# Patient Record
Sex: MALE | Race: WHITE
[De-identification: names, ages, dates, MRNs, and addresses within clinical notes are randomized per-mention and may not be internally consistent; named-entity substitution may affect disease eponyms.]

---

## 2018-02-28 ENCOUNTER — HOSPITAL ENCOUNTER (INPATIENT)
Dept: HOSPITAL 17 - PHED | Age: 48
LOS: 3 days | Discharge: HOME | DRG: 638 | End: 2018-03-03
Attending: HOSPITALIST | Admitting: HOSPITALIST
Payer: SELF-PAY

## 2018-02-28 VITALS
OXYGEN SATURATION: 98 % | HEART RATE: 101 BPM | SYSTOLIC BLOOD PRESSURE: 139 MMHG | DIASTOLIC BLOOD PRESSURE: 75 MMHG | RESPIRATION RATE: 18 BRPM

## 2018-02-28 VITALS
HEART RATE: 126 BPM | DIASTOLIC BLOOD PRESSURE: 100 MMHG | TEMPERATURE: 97.8 F | OXYGEN SATURATION: 95 % | SYSTOLIC BLOOD PRESSURE: 173 MMHG | RESPIRATION RATE: 18 BRPM

## 2018-02-28 VITALS
SYSTOLIC BLOOD PRESSURE: 123 MMHG | DIASTOLIC BLOOD PRESSURE: 54 MMHG | RESPIRATION RATE: 18 BRPM | OXYGEN SATURATION: 97 % | HEART RATE: 102 BPM

## 2018-02-28 VITALS — BODY MASS INDEX: 41.75 KG/M2 | HEIGHT: 73 IN | WEIGHT: 315 LBS

## 2018-02-28 VITALS
HEART RATE: 100 BPM | SYSTOLIC BLOOD PRESSURE: 128 MMHG | DIASTOLIC BLOOD PRESSURE: 72 MMHG | OXYGEN SATURATION: 100 % | RESPIRATION RATE: 18 BRPM

## 2018-02-28 DIAGNOSIS — K31.84: ICD-10-CM

## 2018-02-28 DIAGNOSIS — E86.0: ICD-10-CM

## 2018-02-28 DIAGNOSIS — E66.01: ICD-10-CM

## 2018-02-28 DIAGNOSIS — F17.210: ICD-10-CM

## 2018-02-28 DIAGNOSIS — I10: ICD-10-CM

## 2018-02-28 DIAGNOSIS — K76.0: ICD-10-CM

## 2018-02-28 DIAGNOSIS — E87.6: ICD-10-CM

## 2018-02-28 DIAGNOSIS — E11.43: ICD-10-CM

## 2018-02-28 DIAGNOSIS — Z91.14: ICD-10-CM

## 2018-02-28 DIAGNOSIS — R00.0: ICD-10-CM

## 2018-02-28 DIAGNOSIS — F12.90: ICD-10-CM

## 2018-02-28 DIAGNOSIS — Z91.19: ICD-10-CM

## 2018-02-28 DIAGNOSIS — K08.89: ICD-10-CM

## 2018-02-28 DIAGNOSIS — E11.10: Primary | ICD-10-CM

## 2018-02-28 LAB
ALBUMIN SERPL-MCNC: 3.9 GM/DL (ref 3.4–5)
ALP SERPL-CCNC: 72 U/L (ref 45–117)
ALT SERPL-CCNC: 76 U/L (ref 12–78)
AST SERPL-CCNC: 26 U/L (ref 15–37)
BASOPHILS # BLD AUTO: 0.3 TH/MM3 (ref 0–0.2)
BASOPHILS NFR BLD: 2.4 % (ref 0–2)
BILIRUB SERPL-MCNC: 0.5 MG/DL (ref 0.2–1)
BUN SERPL-MCNC: 13 MG/DL (ref 7–18)
BUN SERPL-MCNC: 16 MG/DL (ref 7–18)
CALCIUM SERPL-MCNC: 8.4 MG/DL (ref 8.5–10.1)
CALCIUM SERPL-MCNC: 9.2 MG/DL (ref 8.5–10.1)
CHLORIDE SERPL-SCNC: 104 MEQ/L (ref 98–107)
CHLORIDE SERPL-SCNC: 98 MEQ/L (ref 98–107)
COLOR UR: YELLOW
CREAT SERPL-MCNC: 0.76 MG/DL (ref 0.6–1.3)
CREAT SERPL-MCNC: 0.82 MG/DL (ref 0.6–1.3)
EOSINOPHIL # BLD: 0 TH/MM3 (ref 0–0.4)
EOSINOPHIL NFR BLD: 0.4 % (ref 0–4)
ERYTHROCYTE [DISTWIDTH] IN BLOOD BY AUTOMATED COUNT: 12.9 % (ref 11.6–17.2)
GFR SERPLBLD BASED ON 1.73 SQ M-ARVRAT: 100 ML/MIN (ref 89–?)
GFR SERPLBLD BASED ON 1.73 SQ M-ARVRAT: 109 ML/MIN (ref 89–?)
GLUCOSE SERPL-MCNC: 324 MG/DL (ref 74–106)
GLUCOSE SERPL-MCNC: 375 MG/DL (ref 74–106)
GLUCOSE UR STRIP-MCNC: (no result) MG/DL
HCO3 BLD-SCNC: 16.5 MEQ/L (ref 21–32)
HCO3 BLD-SCNC: 20.7 MEQ/L (ref 21–32)
HCT VFR BLD CALC: 51.4 % (ref 39–51)
HGB BLD-MCNC: 18.1 GM/DL (ref 13–17)
HGB UR QL STRIP: (no result)
INR PPP: 1 RATIO
KETONES UR STRIP-MCNC: (no result) MG/DL
LYMPHOCYTES # BLD AUTO: 0.9 TH/MM3 (ref 1–4.8)
LYMPHOCYTES NFR BLD AUTO: 7.2 % (ref 9–44)
MCH RBC QN AUTO: 32.6 PG (ref 27–34)
MCHC RBC AUTO-ENTMCNC: 35.2 % (ref 32–36)
MCV RBC AUTO: 92.4 FL (ref 80–100)
MONOCYTE #: 0.6 TH/MM3 (ref 0–0.9)
MONOCYTES NFR BLD: 5.3 % (ref 0–8)
NEUTROPHILS # BLD AUTO: 10.3 TH/MM3 (ref 1.8–7.7)
NEUTROPHILS NFR BLD AUTO: 84.7 % (ref 16–70)
NITRITE UR QL STRIP: (no result)
PLATELET # BLD: 225 TH/MM3 (ref 150–450)
PMV BLD AUTO: 8.7 FL (ref 7–11)
PROT SERPL-MCNC: 8.1 GM/DL (ref 6.4–8.2)
PROTHROMBIN TIME: 10.6 SEC (ref 9.8–11.6)
RBC # BLD AUTO: 5.56 MIL/MM3 (ref 4.5–5.9)
RBC #/AREA URNS HPF: (no result) /HPF (ref 0–3)
SODIUM SERPL-SCNC: 137 MEQ/L (ref 136–145)
SODIUM SERPL-SCNC: 138 MEQ/L (ref 136–145)
SP GR UR STRIP: (no result) (ref 1–1.03)
SQUAMOUS #/AREA URNS HPF: (no result) /HPF (ref 0–5)
TROPONIN I SERPL-MCNC: (no result) NG/ML (ref 0.02–0.05)
URINE LEUKOCYTE ESTERASE: (no result)
WBC # BLD AUTO: 12.1 TH/MM3 (ref 4–11)

## 2018-02-28 PROCEDURE — 82805 BLOOD GASES W/O2 SATURATION: CPT

## 2018-02-28 PROCEDURE — 84100 ASSAY OF PHOSPHORUS: CPT

## 2018-02-28 PROCEDURE — 80053 COMPREHEN METABOLIC PANEL: CPT

## 2018-02-28 PROCEDURE — 96374 THER/PROPH/DIAG INJ IV PUSH: CPT

## 2018-02-28 PROCEDURE — 87804 INFLUENZA ASSAY W/OPTIC: CPT

## 2018-02-28 PROCEDURE — 83690 ASSAY OF LIPASE: CPT

## 2018-02-28 PROCEDURE — 80048 BASIC METABOLIC PNL TOTAL CA: CPT

## 2018-02-28 PROCEDURE — 85610 PROTHROMBIN TIME: CPT

## 2018-02-28 PROCEDURE — 71045 X-RAY EXAM CHEST 1 VIEW: CPT

## 2018-02-28 PROCEDURE — 85025 COMPLETE CBC W/AUTO DIFF WBC: CPT

## 2018-02-28 PROCEDURE — 83735 ASSAY OF MAGNESIUM: CPT

## 2018-02-28 PROCEDURE — 96375 TX/PRO/DX INJ NEW DRUG ADDON: CPT

## 2018-02-28 PROCEDURE — 83036 HEMOGLOBIN GLYCOSYLATED A1C: CPT

## 2018-02-28 PROCEDURE — 81001 URINALYSIS AUTO W/SCOPE: CPT

## 2018-02-28 PROCEDURE — 87641 MR-STAPH DNA AMP PROBE: CPT

## 2018-02-28 PROCEDURE — 87040 BLOOD CULTURE FOR BACTERIA: CPT

## 2018-02-28 PROCEDURE — 82010 KETONE BODYS QUAN: CPT

## 2018-02-28 PROCEDURE — 93005 ELECTROCARDIOGRAM TRACING: CPT

## 2018-02-28 PROCEDURE — 85730 THROMBOPLASTIN TIME PARTIAL: CPT

## 2018-02-28 PROCEDURE — 82948 REAGENT STRIP/BLOOD GLUCOSE: CPT

## 2018-02-28 PROCEDURE — 74177 CT ABD & PELVIS W/CONTRAST: CPT

## 2018-02-28 PROCEDURE — 84484 ASSAY OF TROPONIN QUANT: CPT

## 2018-02-28 PROCEDURE — 96376 TX/PRO/DX INJ SAME DRUG ADON: CPT

## 2018-02-28 PROCEDURE — 96361 HYDRATE IV INFUSION ADD-ON: CPT

## 2018-02-28 RX ADMIN — PHENYTOIN SODIUM SCH MLS/HR: 50 INJECTION INTRAMUSCULAR; INTRAVENOUS at 22:38

## 2018-02-28 NOTE — PD
HPI


Chief Complaint:  GI Complaint


Time Seen by Provider:  19:05


Travel History


International Travel<30 days:  No


Contact w/Intl Traveler<30days:  No


Traveled to known affect area:  No





History of Present Illness


HPI


46yo M with no significant PMH presents to the ED with c/o abdominal pain for 1 

day.  It is periumbilical, associated with nonbloody vomiting and nonbloody 

diarrhea.  Pt feels warm but no documented fever.  Denies any chest pain but 

feels burning in his chest.  Denies any sob, dysuria, hematuria, focal weakness 

or numbness.





PFSH


Past Medical History


Diabetes:  Yes


Patient Takes Glucophage:  No (DOES NOT TAKE MEDS)


Hypertension:  Yes


Medical other:  Yes (DENTAL ABSCESS)


Tetanus Vaccination:  Never Vaccinated


Influenza Vaccination:  No





Social History


Alcohol Use:  Yes (ONCE A MONTH)


Tobacco Use:  Yes (1PPD)


Substance Use:  No





Allergies-Medications


(Allergen,Severity, Reaction):  


Coded Allergies:  


     No Known Allergies (Unverified , 2/28/18)


Reported Meds & Prescriptions





Reported Meds & Active Scripts


Active


Reported


Tylenol (Acetaminophen) 325 Mg Tab Unknown Dose PO ONCE


Ibuprofen 200 Mg Cap Unknown Dose  Q4H PRN


[Nsaid]   Unknown Dose  


Norco (Hydrocodone-Acetaminophen) 5 Mg-325 Mg Tab Unknown Dose PO Q4H PRN








Review of Systems


Except as stated in HPI:  all other systems reviewed are Neg





Physical Exam


Narrative


GENERAL: 46yo M in mild distress. 


SKIN: Focused skin assessment warm/dry.


HEAD: Atraumatic. Normocephalic. 


EYES: Pupils equal and round. No scleral icterus. No injection or drainage. 


CARDIOVASCULAR: Regular rate and rhythm.  No murmur appreciated.


RESPIRATORY: No accessory muscle use. Clear to auscultation. Breath sounds 

equal bilaterally. 


GASTROINTESTINAL: Abdomen soft, +TTP above umbilicus.  No rebound tenderness or 

guarding.


MUSCULOSKELETAL: No obvious deformities. No clubbing.  No cyanosis.  No edema. 


NEUROLOGICAL: Awake and alert. No obvious cranial nerve deficits.  Motor 

grossly within normal limits. Normal speech.


PSYCHIATRIC: Appropriate mood and affect; insight and judgment normal.





Data


Data


Last Documented VS





Vital Signs








  Date Time  Temp Pulse Resp B/P (MAP) Pulse Ox O2 Delivery O2 Flow Rate FiO2


 


2/28/18 22:02  101 18 139/75 (96) 98 Room Air  


 


2/28/18 18:17 97.8       








Orders





 Orders


Complete Blood Count With Diff (2/28/18 19:08)


Comprehensive Metabolic Panel (2/28/18 19:08)


Lipase (2/28/18 19:08)


Prothrombin Time / Inr (Pt) (2/28/18 19:08)


Act Partial Throm Time (Ptt) (2/28/18 19:08)


Urinalysis - C+S If Indicated (2/28/18 19:08)


Ct Abd/Pel W Iv Contrast(Rout) (2/28/18 19:08)


Morphine Inj (Morphine Inj) (2/28/18 19:15)


Ondansetron Inj (Zofran Inj) (2/28/18 19:15)


Sodium Chlor 0.9% 1000 Ml Inj (Ns 1000 M (2/28/18 19:08)


Electrocardiogram (2/28/18 19:08)


Chest, Single Ap (2/28/18 19:08)


Troponin I (2/28/18 19:08)


Sodium Chlor 0.9% 1000 Ml Inj (Ns 1000 M (2/28/18 20:15)


Ondansetron Inj (Zofran Inj) (2/28/18 20:30)


Blood Gas Venous (Vbg) (2/28/18 20:16)


Beta Hydroxybutyrate (Acetone) (2/28/18 19:30)


Iohexol 350 Inj (Omnipaque 350 Inj) (2/28/18 20:31)


Insulin Regular (Iv Infusion) (Novolin R (2/28/18 21:45)


Potassium Chlor 20 Meq Premix (Kcl 20 Me (2/28/18 21:45)


Admit To Inpatient (2/28/18 )


Cardiac Monitor / Telemetry NIKOS.Q8H (2/28/18 21:55)


^ Teach Patient (2/28/18 21:55)


Diet Npo (3/1/18 Breakfast)


Bedside Glucose NIKOS.Q1H (2/28/18 21:55)


Sodium Chlor 0.9% 1000 Ml Inj (Ns 1000 M (2/28/18 21:55)


Dext 5%-Nacl 0.9% 1000 Ml Inj (D5w-Ns 10 (2/28/18 21:55)


Insulin Regular (Iv Infusion) (Novolin R (2/28/18 22:00)


Potassium Chlor 20 Meq Premix (Kcl 20 Me (2/28/18 22:00)


Sodium Phosphate Inj (Sodium Phosphate I (2/28/18 22:00)


Basic Metabolic Panel (Bmp) (3/1/18 02:55)


Basic Metabolic Panel (Bmp) (3/1/18 08:55)


Basic Metabolic Panel (Bmp) (3/1/18 14:55)


Basic Metabolic Panel (Bmp) (3/1/18 20:55)


Magnesium (Mg) (3/1/18 02:55)


Magnesium (Mg) (3/1/18 08:55)


Magnesium (Mg) (3/1/18 14:55)


Magnesium (Mg) (3/1/18 20:55)


Phosphorus (Po4) (3/1/18 02:55)


Phosphorus (Po4) (3/1/18 08:55)


Phosphorus (Po4) (3/1/18 14:55)


Phosphorus (Po4) (3/1/18 20:55)


Beta Hydroxybutyrate (Acetone) (3/1/18 08:55)


Beta Hydroxybutyrate (Acetone) (3/1/18 20:55)


^ Initiate Protocol (2/28/18 21:55)


Instruction (2/28/18 21:55)


Misc Nursing Information (2/28/18 22:00)


Chlorhexidine 2% Cloth (Chlorhexidine 2% (3/1/18 04:00)


Chlorhexidine 2% Cloth (Chlorhexidine 2% (2/28/18 22:00)


Mrsa Pcr Surveillance (2/28/18 21:55)


Inpatient Certification (2/28/18 )


Admit Order (Ed Use Only) (2/28/18 22:09)





Labs





Laboratory Tests








Test


  2/28/18


19:30 2/28/18


20:10 2/28/18


20:15


 


White Blood Count 12.1 TH/MM3   


 


Red Blood Count 5.56 MIL/MM3   


 


Hemoglobin 18.1 GM/DL   


 


Hematocrit 51.4 %   


 


Mean Corpuscular Volume 92.4 FL   


 


Mean Corpuscular Hemoglobin 32.6 PG   


 


Mean Corpuscular Hemoglobin


Concent 35.2 % 


  


  


 


 


Red Cell Distribution Width 12.9 %   


 


Platelet Count 225 TH/MM3   


 


Mean Platelet Volume 8.7 FL   


 


Neutrophils (%) (Auto) 84.7 %   


 


Lymphocytes (%) (Auto) 7.2 %   


 


Monocytes (%) (Auto) 5.3 %   


 


Eosinophils (%) (Auto) 0.4 %   


 


Basophils (%) (Auto) 2.4 %   


 


Neutrophils # (Auto) 10.3 TH/MM3   


 


Lymphocytes # (Auto) 0.9 TH/MM3   


 


Monocytes # (Auto) 0.6 TH/MM3   


 


Eosinophils # (Auto) 0.0 TH/MM3   


 


Basophils # (Auto) 0.3 TH/MM3   


 


CBC Comment DIFF FINAL   


 


Differential Comment    


 


Prothrombin Time 10.6 SEC   


 


Prothromb Time International


Ratio 1.0 RATIO 


  


  


 


 


Activated Partial


Thromboplast Time 22.2 SEC 


  


  


 


 


Blood Urea Nitrogen 16 MG/DL   


 


Creatinine 0.82 MG/DL   


 


Random Glucose 375 MG/DL   


 


Total Protein 8.1 GM/DL   


 


Albumin 3.9 GM/DL   


 


Calcium Level 9.2 MG/DL   


 


Alkaline Phosphatase 72 U/L   


 


Aspartate Amino Transf


(AST/SGOT) 26 U/L 


  


  


 


 


Alanine Aminotransferase


(ALT/SGPT) 76 U/L 


  


  


 


 


Total Bilirubin 0.5 MG/DL   


 


Sodium Level 137 MEQ/L   


 


Potassium Level 4.0 MEQ/L   


 


Chloride Level 98 MEQ/L   


 


Carbon Dioxide Level 20.7 MEQ/L   


 


Anion Gap 18 MEQ/L   


 


Estimat Glomerular Filtration


Rate 100 ML/MIN 


  


  


 


 


Troponin I


  LESS THAN 0.02


NG/ML 


  


 


 


Lipase 114 U/L   


 


B-Hydroxybutyrate 6.65 MMOL/L   


 


Urine Color  YELLOW  


 


Urine Turbidity  CLEAR  


 


Urine pH  5.5  


 


Urine Specific Gravity


  


  GREATER THAN


1.035 


 


 


Urine Protein  NEG mg/dL  


 


Urine Glucose (UA)


  


  1000 OR


GREATER mg/dL 


 


 


Urine Ketones


  


  80 OR GREATER


mg/dL 


 


 


Urine Occult Blood  TRACE  


 


Urine Nitrite  NEG  


 


Urine Bilirubin  NEG  


 


Urine Leukocyte Esterase  NEG  


 


Urine RBC  0-3 /hpf  


 


Urine Squamous Epithelial


Cells 


  0-5 /hpf 


  


 


 


Microscopic Urinalysis Comment


  


  CULT NOT


INDICATED 


 


 


Blood Gas Puncture Site   RARM 


 


Blood Gas Patient Temperature   98.6 


 


Venous Blood pH   7.31 


 


Venous Blood Partial Pressure


CO2 


  


  42 mmHg 


 


 


Venous Blood Partial Pressure


O2 


  


  32 mmHg 


 


 


Venous Blood HCO3   21 mmol/L 


 


Venous Blood Oxygen Saturation   55 % 


 


Venous Blood Oxygen Content   13.8 Vol % 


 


Venous Blood Base Excess   -4.4 mmol/L 


 


Blood Gas Inspired Oxygen   21 % 











[facility]


Medical Decision Making


Medical Screen Exam Complete:  Yes


Emergency Medical Condition:  Yes


Interpretation(s)


EKG; Sinus tachycardia at 108bpm.  Normal axis.  No ST segment elevation or 

depression.


Differential Diagnosis


Acute gastroenteritis vs. colitis vs. GERD vs. viral syndrome


Narrative Course


46yo morbidly obese male here with abdominal pain, vomiting and diarrhea.  Pt 

had appendectomy.  Pt states he has burning midsternal chest pain which is very 

atypical.  Labs reviewed, WBC 12.1.  H/H elevated, likely secondary to 

dehydration.  CO2 mildly decreased at 20.7 and increased anion gap of 18.  

Glucose is elevated at 375.  Pt is mildly acidotic at 7.31. b-Hydroxybutrate is 

elevated at 6.65.   Troponin negative.  Lipase normal.  Pt said he has history 

of HTN, DM but has not seen any physician for a long time so does not take any 

medication.  Lipase normal.  Pt given NS IVF, zofran and morphine.  Pt 

reevaluated at bedside and pain has improved but vomited again.  Pt given a 

second dose of zofran.  Ordered a second liter of NS IVF.  Pt has polydipsia 

and polyuria and mild DKA so placed on insulin drip with potassium replacement 

as needed.  K: 4.0.  CT a/p showed enlarged fatty liver.  Unremarkable CT.  CXR 

showed mild increased interstitial markings bilaterally which are nonspecific.  

Discussed with Dr. Carrion's PA and accepted to their service.


Critical Care Narrative


Aggregate critical care time was  40 minutes. Time to perform other separately 

billable procedures was not  


included in the critical care time. My time did not include minutes spent 

treating any other patients simultaneously or on  


activities that did not directly contribute to the patient's treatment.  





The services I provided to this patient were to treat and/or prevent clinically 

significant deterioration that could result  


in:  cardiovascular collapse or death.





I provided critical care services requiring my management, as noted below:


Chart data review, documentation time, medication orders and management, vital 

sign assessments/reviewing monitor data,  


ordering and reviewing lab tests, ordering and interpreting/reviewing x-rays 

and diagnostic studies, care of the patient  


and discussion of the patient with the admitting physicians.





Diagnosis





 Primary Impression:  


 DKA (diabetic ketoacidoses)


 Qualified Codes:  E13.10 - Other specified diabetes mellitus with ketoacidosis 

without coma





Admitting Information


Admitting Physician Requests:  it











Apple Ennis DO Feb 28, 2018 19:11

## 2018-02-28 NOTE — RADRPT
EXAM DATE/TIME:  02/28/2018 20:26 

 

[facility] COMPARISON:     

No previous studies available for comparison.

 

 

INDICATIONS :     

Abdominal pain with nausea, vomiting, and diarrhea.

                      

 

IV CONTRAST:     

100 cc Omnipaque 350 (iohexol) IV 

 

 

ORAL CONTRAST:      

No oral contrast ingested.

                      

 

RADIATION DOSE:     

31.35 CTDIvol (mGy) ; Patient body habitus

 

 

MEDICAL HISTORY :     

Hypertension. Diabetes mellitus type 2. 

 

SURGICAL HISTORY :      

None. 

 

ENCOUNTER:      

Initial

 

ACUITY:      

1 day

 

PAIN SCALE:      

8/10

 

LOCATION:         

abdomen

 

TECHNIQUE:     

Volumetric scanning of the abdomen and pelvis was performed.  Using automated exposure control and ad
justment of the mA and/or kV according to patient size, radiation dose was kept as low as reasonably 
achievable to obtain optimal diagnostic quality images.  DICOM format image data is available electro
nically for review and comparison.  

 

FINDINGS:     

 

LOWER LUNGS:     

The visualized lower lungs are clear.

 

LIVER:     

The liver is enlarged and demonstrates diffuse fatty infiltration. No focal mass is noted. There is n
o dilation of the biliary tree.  No calcified gallstones.

 

SPLEEN:     

Normal size without lesion.

 

PANCREAS:     

Within normal limits.

 

KIDNEYS:     

Normal in size and shape.  There is no mass, stone or hydronephrosis.

 

ADRENAL GLANDS:     

Within normal limits.

 

VASCULAR:     

There is no aortic aneurysm.

 

BOWEL/MESENTERY:     

The stomach, small bowel, and colon demonstrate no acute abnormality.  There is no free intraperitone
al air or fluid.

 

ABDOMINAL WALL:     

Within normal limits.

 

RETROPERITONEUM:     

There is no lymphadenopathy. 

 

BLADDER:     

No wall thickening or mass. 

 

REPRODUCTIVE:     

Within normal limits.

 

INGUINAL:     

There is no lymphadenopathy or hernia. 

 

MUSCULOSKELETAL:     

Within normal limits for patient age. 

 

CONCLUSION:     Enlarged fatty liver. Otherwise unremarkable CT of the abdomen and pelvis. 

 

 

 Guru Barros MD on February 28, 2018 at 20:47           

Board Certified Radiologist.

 This report was verified electronically.

## 2018-02-28 NOTE — RADRPT
EXAM DATE/TIME:  02/28/2018 19:30 

 

[facility] COMPARISON:     

No previous studies available for comparison.

 

                     

INDICATIONS :     

Chest discomfort, vomiting, abdominal pain starting today

                     

 

MEDICAL HISTORY :     

None.          

 

SURGICAL HISTORY :     

None.   

 

ENCOUNTER:     

Initial                                        

 

ACUITY:     

1 day      

 

PAIN SCORE:     

0/10

 

LOCATION:     

Bilateral chest 

 

FINDINGS:     

Mild increased interstitial markings are noted bilaterally and are nonspecific. The heart is normal i
n size. No focal alveolar consolidation is noted.

 

CONCLUSION:     Mild increased interstitial markings bilaterally which are nonspecific. 

 

 

 Guru Barros MD on February 28, 2018 at 20:11           

Board Certified Radiologist.

 This report was verified electronically.

## 2018-03-01 VITALS — HEART RATE: 84 BPM

## 2018-03-01 VITALS
OXYGEN SATURATION: 99 % | SYSTOLIC BLOOD PRESSURE: 137 MMHG | RESPIRATION RATE: 16 BRPM | HEART RATE: 114 BPM | DIASTOLIC BLOOD PRESSURE: 81 MMHG

## 2018-03-01 VITALS
DIASTOLIC BLOOD PRESSURE: 78 MMHG | RESPIRATION RATE: 33 BRPM | HEART RATE: 110 BPM | SYSTOLIC BLOOD PRESSURE: 150 MMHG | TEMPERATURE: 98.9 F

## 2018-03-01 VITALS
SYSTOLIC BLOOD PRESSURE: 133 MMHG | TEMPERATURE: 97.8 F | HEART RATE: 102 BPM | DIASTOLIC BLOOD PRESSURE: 66 MMHG | RESPIRATION RATE: 22 BRPM

## 2018-03-01 VITALS — SYSTOLIC BLOOD PRESSURE: 145 MMHG | RESPIRATION RATE: 20 BRPM | HEART RATE: 92 BPM | DIASTOLIC BLOOD PRESSURE: 65 MMHG

## 2018-03-01 VITALS — SYSTOLIC BLOOD PRESSURE: 148 MMHG | DIASTOLIC BLOOD PRESSURE: 78 MMHG | HEART RATE: 110 BPM | RESPIRATION RATE: 21 BRPM

## 2018-03-01 VITALS — HEART RATE: 82 BPM | SYSTOLIC BLOOD PRESSURE: 135 MMHG | RESPIRATION RATE: 23 BRPM | DIASTOLIC BLOOD PRESSURE: 78 MMHG

## 2018-03-01 VITALS — HEART RATE: 118 BPM | RESPIRATION RATE: 20 BRPM

## 2018-03-01 VITALS — HEART RATE: 96 BPM

## 2018-03-01 VITALS
TEMPERATURE: 97.6 F | RESPIRATION RATE: 26 BRPM | SYSTOLIC BLOOD PRESSURE: 150 MMHG | HEART RATE: 116 BPM | OXYGEN SATURATION: 98 % | DIASTOLIC BLOOD PRESSURE: 72 MMHG

## 2018-03-01 VITALS — DIASTOLIC BLOOD PRESSURE: 72 MMHG | SYSTOLIC BLOOD PRESSURE: 128 MMHG | HEART RATE: 110 BPM | RESPIRATION RATE: 21 BRPM

## 2018-03-01 VITALS — OXYGEN SATURATION: 96 % | RESPIRATION RATE: 20 BRPM | HEART RATE: 128 BPM

## 2018-03-01 VITALS — RESPIRATION RATE: 28 BRPM | DIASTOLIC BLOOD PRESSURE: 88 MMHG | HEART RATE: 80 BPM | SYSTOLIC BLOOD PRESSURE: 150 MMHG

## 2018-03-01 VITALS
SYSTOLIC BLOOD PRESSURE: 157 MMHG | OXYGEN SATURATION: 99 % | HEART RATE: 114 BPM | DIASTOLIC BLOOD PRESSURE: 81 MMHG | RESPIRATION RATE: 16 BRPM

## 2018-03-01 VITALS — TEMPERATURE: 98.5 F | HEART RATE: 104 BPM

## 2018-03-01 VITALS — HEART RATE: 82 BPM

## 2018-03-01 VITALS — HEART RATE: 128 BPM | DIASTOLIC BLOOD PRESSURE: 71 MMHG | RESPIRATION RATE: 41 BRPM | SYSTOLIC BLOOD PRESSURE: 155 MMHG

## 2018-03-01 VITALS
SYSTOLIC BLOOD PRESSURE: 129 MMHG | RESPIRATION RATE: 23 BRPM | TEMPERATURE: 98.4 F | OXYGEN SATURATION: 92 % | HEART RATE: 118 BPM | DIASTOLIC BLOOD PRESSURE: 80 MMHG

## 2018-03-01 VITALS
RESPIRATION RATE: 20 BRPM | HEART RATE: 80 BPM | OXYGEN SATURATION: 98 % | SYSTOLIC BLOOD PRESSURE: 131 MMHG | DIASTOLIC BLOOD PRESSURE: 76 MMHG

## 2018-03-01 VITALS — DIASTOLIC BLOOD PRESSURE: 72 MMHG | SYSTOLIC BLOOD PRESSURE: 168 MMHG

## 2018-03-01 VITALS — SYSTOLIC BLOOD PRESSURE: 162 MMHG | DIASTOLIC BLOOD PRESSURE: 81 MMHG | RESPIRATION RATE: 35 BRPM | HEART RATE: 112 BPM

## 2018-03-01 VITALS
SYSTOLIC BLOOD PRESSURE: 154 MMHG | RESPIRATION RATE: 34 BRPM | OXYGEN SATURATION: 98 % | DIASTOLIC BLOOD PRESSURE: 88 MMHG | HEART RATE: 118 BPM

## 2018-03-01 VITALS — RESPIRATION RATE: 19 BRPM | HEART RATE: 102 BPM | DIASTOLIC BLOOD PRESSURE: 76 MMHG | SYSTOLIC BLOOD PRESSURE: 155 MMHG

## 2018-03-01 VITALS — HEART RATE: 80 BPM

## 2018-03-01 VITALS — DIASTOLIC BLOOD PRESSURE: 91 MMHG | HEART RATE: 90 BPM | SYSTOLIC BLOOD PRESSURE: 148 MMHG | RESPIRATION RATE: 22 BRPM

## 2018-03-01 VITALS — HEART RATE: 108 BPM

## 2018-03-01 VITALS
OXYGEN SATURATION: 96 % | HEART RATE: 96 BPM | RESPIRATION RATE: 22 BRPM | DIASTOLIC BLOOD PRESSURE: 68 MMHG | SYSTOLIC BLOOD PRESSURE: 136 MMHG

## 2018-03-01 VITALS — HEART RATE: 86 BPM

## 2018-03-01 VITALS — RESPIRATION RATE: 31 BRPM | HEART RATE: 104 BPM

## 2018-03-01 VITALS — HEART RATE: 126 BPM

## 2018-03-01 VITALS — HEART RATE: 107 BPM

## 2018-03-01 LAB
BUN SERPL-MCNC: 13 MG/DL (ref 7–18)
BUN SERPL-MCNC: 14 MG/DL (ref 7–18)
BUN SERPL-MCNC: 14 MG/DL (ref 7–18)
BUN SERPL-MCNC: 16 MG/DL (ref 7–18)
BUN SERPL-MCNC: 16 MG/DL (ref 7–18)
CALCIUM SERPL-MCNC: 8 MG/DL (ref 8.5–10.1)
CALCIUM SERPL-MCNC: 8 MG/DL (ref 8.5–10.1)
CALCIUM SERPL-MCNC: 8.3 MG/DL (ref 8.5–10.1)
CALCIUM SERPL-MCNC: 8.4 MG/DL (ref 8.5–10.1)
CALCIUM SERPL-MCNC: 8.7 MG/DL (ref 8.5–10.1)
CHLORIDE SERPL-SCNC: 105 MEQ/L (ref 98–107)
CHLORIDE SERPL-SCNC: 107 MEQ/L (ref 98–107)
CHLORIDE SERPL-SCNC: 108 MEQ/L (ref 98–107)
CREAT SERPL-MCNC: 0.61 MG/DL (ref 0.6–1.3)
CREAT SERPL-MCNC: 0.66 MG/DL (ref 0.6–1.3)
CREAT SERPL-MCNC: 0.78 MG/DL (ref 0.6–1.3)
CREAT SERPL-MCNC: 0.83 MG/DL (ref 0.6–1.3)
CREAT SERPL-MCNC: 0.92 MG/DL (ref 0.6–1.3)
GFR SERPLBLD BASED ON 1.73 SQ M-ARVRAT: 106 ML/MIN (ref 89–?)
GFR SERPLBLD BASED ON 1.73 SQ M-ARVRAT: 129 ML/MIN (ref 89–?)
GFR SERPLBLD BASED ON 1.73 SQ M-ARVRAT: 141 ML/MIN (ref 89–?)
GFR SERPLBLD BASED ON 1.73 SQ M-ARVRAT: 88 ML/MIN (ref 89–?)
GFR SERPLBLD BASED ON 1.73 SQ M-ARVRAT: 99 ML/MIN (ref 89–?)
GLUCOSE SERPL-MCNC: 174 MG/DL (ref 74–106)
GLUCOSE SERPL-MCNC: 213 MG/DL (ref 74–106)
GLUCOSE SERPL-MCNC: 214 MG/DL (ref 74–106)
GLUCOSE SERPL-MCNC: 280 MG/DL (ref 74–106)
GLUCOSE SERPL-MCNC: 287 MG/DL (ref 74–106)
HCO3 BLD-SCNC: 15.3 MEQ/L (ref 21–32)
HCO3 BLD-SCNC: 19 MEQ/L (ref 21–32)
HCO3 BLD-SCNC: 21.4 MEQ/L (ref 21–32)
HCO3 BLD-SCNC: 25 MEQ/L (ref 21–32)
HCO3 BLD-SCNC: 25.8 MEQ/L (ref 21–32)
MAGNESIUM SERPL-MCNC: 1.8 MG/DL (ref 1.5–2.5)
MAGNESIUM SERPL-MCNC: 1.8 MG/DL (ref 1.5–2.5)
MAGNESIUM SERPL-MCNC: 1.9 MG/DL (ref 1.5–2.5)
MAGNESIUM SERPL-MCNC: 1.9 MG/DL (ref 1.5–2.5)
PHOSPHATE SERPL-MCNC: 1.5 MG/DL (ref 2.5–4.9)
PHOSPHATE SERPL-MCNC: 2.3 MG/DL (ref 2.5–4.9)
PHOSPHATE SERPL-MCNC: 2.4 MG/DL (ref 2.5–4.9)
PHOSPHATE SERPL-MCNC: 3 MG/DL (ref 2.5–4.9)
SODIUM SERPL-SCNC: 137 MEQ/L (ref 136–145)
SODIUM SERPL-SCNC: 140 MEQ/L (ref 136–145)
SODIUM SERPL-SCNC: 141 MEQ/L (ref 136–145)
SODIUM SERPL-SCNC: 143 MEQ/L (ref 136–145)
SODIUM SERPL-SCNC: 145 MEQ/L (ref 136–145)

## 2018-03-01 RX ADMIN — SODIUM BICARBONATE SCH MLS/HR: 84 INJECTION, SOLUTION INTRAVENOUS at 13:21

## 2018-03-01 RX ADMIN — ACYCLOVIR SCH UNITS: 800 TABLET ORAL at 15:50

## 2018-03-01 RX ADMIN — POTASSIUM CHLORIDE PRN MLS/HR: 200 INJECTION, SOLUTION INTRAVENOUS at 05:54

## 2018-03-01 RX ADMIN — ONDANSETRON PRN MG: 2 INJECTION, SOLUTION INTRAMUSCULAR; INTRAVENOUS at 15:50

## 2018-03-01 RX ADMIN — ONDANSETRON PRN MG: 2 INJECTION, SOLUTION INTRAMUSCULAR; INTRAVENOUS at 22:27

## 2018-03-01 RX ADMIN — SODIUM BICARBONATE SCH MLS/HR: 84 INJECTION, SOLUTION INTRAVENOUS at 08:10

## 2018-03-01 RX ADMIN — POTASSIUM CHLORIDE PRN MLS/HR: 200 INJECTION, SOLUTION INTRAVENOUS at 09:00

## 2018-03-01 RX ADMIN — CHLORHEXIDINE GLUCONATE SCH PACK: 500 CLOTH TOPICAL at 04:00

## 2018-03-01 RX ADMIN — INSULIN ASPART SCH: 100 INJECTION, SOLUTION INTRAVENOUS; SUBCUTANEOUS at 21:01

## 2018-03-01 RX ADMIN — PHENYTOIN SODIUM SCH MLS/HR: 50 INJECTION INTRAMUSCULAR; INTRAVENOUS at 09:55

## 2018-03-01 RX ADMIN — MORPHINE SULFATE PRN MG: 2 INJECTION, SOLUTION INTRAMUSCULAR; INTRAVENOUS at 02:44

## 2018-03-01 RX ADMIN — ONDANSETRON PRN MG: 2 INJECTION, SOLUTION INTRAMUSCULAR; INTRAVENOUS at 02:44

## 2018-03-01 RX ADMIN — DEXTROSE MONOHYDRATE PRN MLS/HR: 10 INJECTION, SOLUTION INTRAVENOUS at 05:18

## 2018-03-01 RX ADMIN — PHENYTOIN SODIUM SCH MLS/HR: 50 INJECTION INTRAMUSCULAR; INTRAVENOUS at 01:55

## 2018-03-01 RX ADMIN — MORPHINE SULFATE PRN MG: 2 INJECTION, SOLUTION INTRAMUSCULAR; INTRAVENOUS at 21:02

## 2018-03-01 RX ADMIN — PHENYTOIN SODIUM SCH MLS/HR: 50 INJECTION INTRAMUSCULAR; INTRAVENOUS at 05:55

## 2018-03-01 RX ADMIN — POTASSIUM CHLORIDE PRN MLS/HR: 200 INJECTION, SOLUTION INTRAVENOUS at 04:06

## 2018-03-01 RX ADMIN — SODIUM BICARBONATE SCH MLS/HR: 84 INJECTION, SOLUTION INTRAVENOUS at 02:45

## 2018-03-01 RX ADMIN — DEXTROSE MONOHYDRATE PRN MLS/HR: 10 INJECTION, SOLUTION INTRAVENOUS at 00:07

## 2018-03-01 RX ADMIN — POTASSIUM CHLORIDE PRN MLS/HR: 200 INJECTION, SOLUTION INTRAVENOUS at 06:57

## 2018-03-01 RX ADMIN — INSULIN ASPART SCH: 100 INJECTION, SOLUTION INTRAVENOUS; SUBCUTANEOUS at 17:11

## 2018-03-01 RX ADMIN — ONDANSETRON PRN MG: 2 INJECTION, SOLUTION INTRAMUSCULAR; INTRAVENOUS at 08:59

## 2018-03-01 RX ADMIN — SODIUM BICARBONATE SCH MLS/HR: 84 INJECTION, SOLUTION INTRAVENOUS at 04:07

## 2018-03-01 NOTE — EKG
Date Performed: 02/28/2018       Time Performed: 19:18:05

 

PTAGE:      47 years

 

EKG:      SINUS TACHYCARDIA POSSIBLE LEFT ATRIAL ENLARGEMENT INCOMPLETE RIGHT BUNDLE BRANCH BLOCK PRO
BABLE INFERIOR MYOCARDIAL INFARCTION ABNORMAL ECG 

 

NO PREVIOUS TRACING            

 

DOCTOR:   Shawn Bernard  Interpretating Date/Time  03/01/2018 10:51:17

## 2018-03-01 NOTE — HHI.HP
__________________________________________________





[facility]


Service


[facility]


Primary Care Physician


No Primary Care Physician


Admission Diagnosis





DKA


Diagnoses:  


(1) DKA (diabetic ketoacidoses)


Chief Complaint:  


Abdominal pain


Travel History


International Travel<30 Days:  No


Contact w/Intl Traveler <30 Da:  No


Traveled to Known Affected Are:  No


History of Present Illness


The patient is a morbidly obese 47-year-old male with history of diabetes who 

presented to the emergency department with complaint of abdominal pain that 

started at 4 AM yesterday.  He states that the pain woke him from sleep.  He 

has had nausea and vomiting and has not been able to keep any food or drink 

down since that time.  He also reports subjective fever and chills.  Denies 

diarrhea or constipation.  Denies chest pain or dyspnea.  States that he has 

been taking pain medication for a toothache.  He states that he ate 3 slices of 

pizza before going to bed 2 nights ago and had no nausea or vomiting until he 

was awakened at 4 AM with abdominal pain.  He reports that the pain has 

improved and he is thirsty this morning.





Review of Systems


Constitutional:  COMPLAINS OF: Fever, Chills, Night Sweats


Eyes:  DENIES: Blurred vision, Vision loss


Ears, nose, mouth, throat:  COMPLAINS OF: Toothache, DENIES: Hearing loss


Respiratory:  DENIES: Cough, Wheezing, Sputum production, Shortness of breath


Cardiovascular:  DENIES: Chest pain, Palpitations, Dyspnea on Exertion, Lower 

Extremity Edema


Gastrointestinal:  COMPLAINS OF: Abdominal pain, Nausea, Vomiting, DENIES: 

Constipation, Diarrhea


Genitourinary:  DENIES: Urinary frequency, Urinary incontinence, Urgency, 

Hematuria, Dysuria, Nocturia


Musculoskeletal:  DENIES: Joint pain, Muscle aches


Integumentary:  DENIES: Pruritus, Rash


Hematologic/lymphatic:  DENIES: Bruising


Neurologic:  DENIES: Headache





Past Family Social History


Past Medical History


Diabetes mellitus


Hypertension


Past Surgical History


Denies


Reported Medications


Tylenol (Acetaminophen) 325 Mg Tab Unknown Dose PO ONCE


Ibuprofen 200 Mg Cap Unknown Dose  Q4H PRN


Norco (Hydrocodone-Acetaminophen) 5 Mg-325 Mg Tab Unknown Dose PO Q4H PRN


Allergies:  


Coded Allergies:  


     No Known Allergies (Unverified , 18)


Family History


Diabetes


Heart disease


Social History


Occasional alcohol use.  Smokes 1 pack per day.  Reports occasional marijuana 

use.  Denies IV drug abuse.





Physical Exam


Vital Signs





Vital Signs








  Date Time  Temp Pulse Resp B/P (MAP) Pulse Ox O2 Delivery O2 Flow Rate FiO2


 


3/1/18 06:00  107      


 


3/1/18 04:05 98.4 118 23 129/80 (96) 92   


 


3/1/18 04:00  128      


 


3/1/18 04:00   24     


 


3/1/18 04:00  116 20  96   


 


3/1/18 03:30  114 16 168/72 (104) 99   


 


3/1/18 02:24  114 16 157/81 (106) 99 Room Air  


 


3/1/18 01:05  114 16 137/81 (99) 99 Room Air  


 


18 22:02  101 18 139/75 (96) 98 Room Air  


 


18 21:35  100 18 128/72 (90) 100   


 


18 20:38  102 18 123/54 (77) 97 Room Air  


 


18 18:17 97.8 126 18 173/100 (124) 95   








Physical Exam


GENERAL: Morbidly obese male in no acute distress. 


HEENT: Normocephalic, atraumatic. Pupils equal, round and reactive. Extraocular 

movements intact. No scleral icterus. No injection or drainage. Oropharynx is 

clear. Mucous membranes are moist. 


CARDIOVASCULAR: Regular rate and rhythm without murmurs, gallops, or rubs. 


RESPIRATORY: Clear to auscultation. No wheezes, rales, or rhonchi. Breathing is 

non-labored. 


GASTROINTESTINAL: Abdomen soft, mild diffuse tenderness to palpation without 

rebound or guarding, nondistended.  


EXTREMITIES: No lower extremity edema. No calf tenderness.


PSYCH: Alert and oriented x 3.


Laboratory





Laboratory Tests








Test


  18


19:30 18


20:10 18


20:15 18


23:10


 


White Blood Count 12.1    


 


Red Blood Count 5.56    


 


Hemoglobin 18.1    


 


Hematocrit 51.4    


 


Mean Corpuscular Volume 92.4    


 


Mean Corpuscular Hemoglobin 32.6    


 


Mean Corpuscular Hemoglobin


Concent 35.2 


  


  


  


 


 


Red Cell Distribution Width 12.9    


 


Platelet Count 225    


 


Mean Platelet Volume 8.7    


 


Neutrophils (%) (Auto) 84.7    


 


Lymphocytes (%) (Auto) 7.2    


 


Monocytes (%) (Auto) 5.3    


 


Eosinophils (%) (Auto) 0.4    


 


Basophils (%) (Auto) 2.4    


 


Neutrophils # (Auto) 10.3    


 


Lymphocytes # (Auto) 0.9    


 


Monocytes # (Auto) 0.6    


 


Eosinophils # (Auto) 0.0    


 


Basophils # (Auto) 0.3    


 


CBC Comment DIFF FINAL    


 


Differential Comment     


 


Prothrombin Time 10.6    


 


Prothromb Time International


Ratio 1.0 


  


  


  


 


 


Activated Partial


Thromboplast Time 22.2 


  


  


  


 


 


Blood Urea Nitrogen 16    13 


 


Creatinine 0.82    0.76 


 


Random Glucose 375    324 


 


Total Protein 8.1    


 


Albumin 3.9    


 


Calcium Level 9.2    8.4 


 


Alkaline Phosphatase 72    


 


Aspartate Amino Transf


(AST/SGOT) 26 


  


  


  


 


 


Alanine Aminotransferase


(ALT/SGPT) 76 


  


  


  


 


 


Total Bilirubin 0.5    


 


Sodium Level 137    138 


 


Potassium Level 4.0    3.9 


 


Chloride Level 98    104 


 


Carbon Dioxide Level 20.7    16.5 


 


Anion Gap 18    18 


 


Estimat Glomerular Filtration


Rate 100 


  


  


  109 


 


 


Troponin I LESS THAN 0.02    


 


Lipase 114    


 


B-Hydroxybutyrate 6.65    6.90 


 


Urine Color  YELLOW   


 


Urine Turbidity  CLEAR   


 


Urine pH  5.5   


 


Urine Specific Gravity


  


  GREATER THAN


1.035 


  


 


 


Urine Protein  NEG   


 


Urine Glucose (UA)


  


  1000 OR


GREATER 


  


 


 


Urine Ketones  80 OR GREATER   


 


Urine Occult Blood  TRACE   


 


Urine Nitrite  NEG   


 


Urine Bilirubin  NEG   


 


Urine Leukocyte Esterase  NEG   


 


Urine RBC  0-3   


 


Urine Squamous Epithelial


Cells 


  0-5 


  


  


 


 


Microscopic Urinalysis Comment


  


  CULT NOT


INDICATED 


  


 


 


Blood Gas Puncture Site   RARM  


 


Blood Gas Patient Temperature   98.6  


 


Venous Blood pH   7.31  


 


Venous Blood Partial Pressure


CO2 


  


  42 


  


 


 


Venous Blood Partial Pressure


O2 


  


  32 


  


 


 


Venous Blood HCO3   21  


 


Venous Blood Oxygen Saturation   55  


 


Venous Blood Oxygen Content   13.8  


 


Venous Blood Base Excess   -4.4  


 


Blood Gas Inspired Oxygen   21  


 


Test


  3/1/18


01:05 3/1/18


02:50 3/1/18


04:30 


 


 


Blood Urea Nitrogen 14  14   


 


Creatinine 0.78  0.92   


 


Random Glucose 287  174   


 


Calcium Level 8.4  8.7   


 


Sodium Level 141  143   


 


Potassium Level 3.5  3.3   


 


Chloride Level 107  107   


 


Carbon Dioxide Level 15.3  19.0   


 


Anion Gap 19  17   


 


Estimat Glomerular Filtration


Rate 106 


  88 


  


  


 


 


Phosphorus Level  3.0   


 


Magnesium Level  1.8   








Result Diagram:  


2/28/18 1930                                                                   

             3/1/18 0250





Imaging





Last Impressions








Chest X-Ray 18 190 Signed





Impressions: 





 Service Date/Time:  2018 19:30 - CONCLUSION: Mild 





 increased interstitial markings bilaterally which are nonspecific.     Guru Barros MD 


 


Abdomen/Pelvis CT 18 Signed





Impressions: 





 Service Date/Time:  2018 20:26 - CONCLUSION: Enlarged 





 fatty liver. Otherwise unremarkable CT of the abdomen and pelvis.     Guru Barros MD Caprini VTE Risk Assessment


Caprini VTE Risk Assessment:  No/Low Risk (score <= 1)


Caprini Risk Assessment Model











 Point Value = 1          Point Value = 2  Point Value = 3  Point Value = 5


 


Age 41-60


Minor surgery


BMI > 25 kg/m2


Swollen legs


Varicose veins


Pregnancy or postpartum


History of unexplained or recurrent


   spontaneous 


Oral contraceptives or hormone


   replacement


Sepsis (< 1 month)


Serious lung disease, including


   pneumonia (< 1 month)


Abnormal pulmonary function


Acute myocardial infarction


Congestive heart failure (< 1 month)


History of inflammatory bowel disease


Medical patient at bed rest Age 61-74


Arthroscopic surgery


Major open surgery (> 45 min)


Laparoscopic surgery (> 45 min)


Malignancy


Confined to bed (> 72 hours)


Immobilizing plaster cast


Central venous access Age >= 75


History of VTE


Family history of VTE


Factor V Leiden


Prothrombin 09296T


Lupus anticoagulant


Anticardiolipin antibodies


Elevated serum homocysteine


Heparin-induced thrombocytopenia


Other congenital or acquired


   thrombophilia Stroke (< 1 month)


Elective arthroplasty


Hip, pelvis, or leg fracture


Acute spinal cord injury (< 1 month)








Prophylaxis Regimen











   Total Risk


Factor Score Risk Level Prophylaxis Regimen


 


0-1      Low Early ambulation


 


2 Moderate Order ONE of the following:


*Sequential Compression Device (SCD)


*Heparin 5000 units SQ BID


 


3-4 Higher Order ONE of the following medications:


*Heparin 5000 units SQ TID


*Enoxaparin/Lovenox 40 mg SQ daily (WT < 150 kg, CrCl > 30 mL/min)


*Enoxaparin/Lovenox 30 mg SQ daily (WT < 150 kg, CrCl > 10-29 mL/min)


*Enoxaparin/Lovenox 30 mg SQ BID (WT < 150 kg, CrCl > 30 mL/min)


AND/OR


*Sequential Compression Device (SCD)


 


5 or more Highest Order ONE of the following medications:


*Heparin 5000 units SQ TID (Preferred with Epidurals)


*Enoxaparin/Lovenox 40 mg SQ daily (WT < 150 kg, CrCl > 30 mL/min)


*Enoxaparin/Lovenox 30 mg SQ daily (WT < 150 kg, CrCl > 10-29 mL/min)


*Enoxaparin/Lovenox 30 mg SQ BID (WT < 150 kg, CrCl > 30 mL/min)


AND


*Sequential Compression Device (SCD)











Assessment and Plan


Assessment and Plan


1.  Diabetic ketoacidosis: Continue DKA protocol.  Still on insulin drip.  

Anion gap still elevated.  Monitor Accu-Cheks and transition off DKA protocol 

once labs have improved.


2.  Hypokalemia: Supplement potassium.


3.  Tobacco abuse: Counseled to quit smoking.


4.  DVT prophylaxis: SCDs, NONA hose.





Problem Qualifiers





(1) DKA (diabetic ketoacidoses):  


Qualified Codes:  E13.10 - Other specified diabetes mellitus with ketoacidosis 

without coma








Chavez Stack MD Mar 1, 2018 08:58

## 2018-03-02 VITALS
DIASTOLIC BLOOD PRESSURE: 88 MMHG | OXYGEN SATURATION: 96 % | HEART RATE: 120 BPM | SYSTOLIC BLOOD PRESSURE: 137 MMHG | RESPIRATION RATE: 28 BRPM | TEMPERATURE: 98.8 F

## 2018-03-02 VITALS — RESPIRATION RATE: 36 BRPM | HEART RATE: 72 BPM | DIASTOLIC BLOOD PRESSURE: 60 MMHG | SYSTOLIC BLOOD PRESSURE: 122 MMHG

## 2018-03-02 VITALS — SYSTOLIC BLOOD PRESSURE: 138 MMHG | DIASTOLIC BLOOD PRESSURE: 83 MMHG | HEART RATE: 70 BPM | RESPIRATION RATE: 30 BRPM

## 2018-03-02 VITALS — SYSTOLIC BLOOD PRESSURE: 153 MMHG | RESPIRATION RATE: 44 BRPM | DIASTOLIC BLOOD PRESSURE: 86 MMHG | HEART RATE: 78 BPM

## 2018-03-02 VITALS
SYSTOLIC BLOOD PRESSURE: 136 MMHG | TEMPERATURE: 98 F | HEART RATE: 106 BPM | DIASTOLIC BLOOD PRESSURE: 67 MMHG | RESPIRATION RATE: 21 BRPM

## 2018-03-02 VITALS
RESPIRATION RATE: 18 BRPM | SYSTOLIC BLOOD PRESSURE: 129 MMHG | OXYGEN SATURATION: 95 % | DIASTOLIC BLOOD PRESSURE: 69 MMHG | HEART RATE: 96 BPM | TEMPERATURE: 97.9 F

## 2018-03-02 VITALS — HEART RATE: 85 BPM

## 2018-03-02 VITALS — SYSTOLIC BLOOD PRESSURE: 118 MMHG | DIASTOLIC BLOOD PRESSURE: 73 MMHG | HEART RATE: 100 BPM

## 2018-03-02 VITALS
RESPIRATION RATE: 23 BRPM | SYSTOLIC BLOOD PRESSURE: 155 MMHG | HEART RATE: 74 BPM | DIASTOLIC BLOOD PRESSURE: 85 MMHG | OXYGEN SATURATION: 96 %

## 2018-03-02 VITALS — HEART RATE: 76 BPM | DIASTOLIC BLOOD PRESSURE: 72 MMHG | SYSTOLIC BLOOD PRESSURE: 113 MMHG

## 2018-03-02 VITALS
SYSTOLIC BLOOD PRESSURE: 124 MMHG | TEMPERATURE: 100 F | RESPIRATION RATE: 32 BRPM | HEART RATE: 72 BPM | DIASTOLIC BLOOD PRESSURE: 78 MMHG

## 2018-03-02 VITALS
HEART RATE: 74 BPM | SYSTOLIC BLOOD PRESSURE: 147 MMHG | TEMPERATURE: 98.9 F | DIASTOLIC BLOOD PRESSURE: 81 MMHG | RESPIRATION RATE: 40 BRPM

## 2018-03-02 VITALS
HEART RATE: 104 BPM | TEMPERATURE: 98.1 F | RESPIRATION RATE: 33 BRPM | DIASTOLIC BLOOD PRESSURE: 74 MMHG | SYSTOLIC BLOOD PRESSURE: 135 MMHG

## 2018-03-02 VITALS
DIASTOLIC BLOOD PRESSURE: 79 MMHG | SYSTOLIC BLOOD PRESSURE: 131 MMHG | OXYGEN SATURATION: 96 % | RESPIRATION RATE: 20 BRPM | HEART RATE: 84 BPM

## 2018-03-02 VITALS — HEART RATE: 102 BPM

## 2018-03-02 VITALS — RESPIRATION RATE: 28 BRPM | HEART RATE: 98 BPM | DIASTOLIC BLOOD PRESSURE: 73 MMHG | SYSTOLIC BLOOD PRESSURE: 141 MMHG

## 2018-03-02 VITALS — HEART RATE: 78 BPM | DIASTOLIC BLOOD PRESSURE: 73 MMHG | SYSTOLIC BLOOD PRESSURE: 120 MMHG

## 2018-03-02 VITALS — SYSTOLIC BLOOD PRESSURE: 104 MMHG | HEART RATE: 76 BPM | DIASTOLIC BLOOD PRESSURE: 53 MMHG

## 2018-03-02 VITALS — HEART RATE: 74 BPM

## 2018-03-02 VITALS — DIASTOLIC BLOOD PRESSURE: 65 MMHG | RESPIRATION RATE: 41 BRPM | SYSTOLIC BLOOD PRESSURE: 130 MMHG | HEART RATE: 72 BPM

## 2018-03-02 VITALS — HEART RATE: 117 BPM

## 2018-03-02 VITALS
SYSTOLIC BLOOD PRESSURE: 148 MMHG | OXYGEN SATURATION: 96 % | HEART RATE: 78 BPM | RESPIRATION RATE: 19 BRPM | DIASTOLIC BLOOD PRESSURE: 85 MMHG

## 2018-03-02 VITALS — HEART RATE: 72 BPM

## 2018-03-02 VITALS — HEART RATE: 88 BPM

## 2018-03-02 VITALS — HEART RATE: 78 BPM

## 2018-03-02 VITALS — HEART RATE: 68 BPM

## 2018-03-02 VITALS — HEART RATE: 106 BPM

## 2018-03-02 LAB
BASOPHILS # BLD AUTO: 0.5 TH/MM3 (ref 0–0.2)
BASOPHILS NFR BLD: 3 % (ref 0–2)
BUN SERPL-MCNC: 13 MG/DL (ref 7–18)
CALCIUM SERPL-MCNC: 8.2 MG/DL (ref 8.5–10.1)
CHLORIDE SERPL-SCNC: 101 MEQ/L (ref 98–107)
CREAT SERPL-MCNC: 0.75 MG/DL (ref 0.6–1.3)
EOSINOPHIL # BLD: 0.3 TH/MM3 (ref 0–0.4)
EOSINOPHIL NFR BLD: 1.7 % (ref 0–4)
ERYTHROCYTE [DISTWIDTH] IN BLOOD BY AUTOMATED COUNT: 13 % (ref 11.6–17.2)
GFR SERPLBLD BASED ON 1.73 SQ M-ARVRAT: 111 ML/MIN (ref 89–?)
GLUCOSE SERPL-MCNC: 252 MG/DL (ref 74–106)
HBA1C MFR BLD: 13.5 % (ref 4.3–6)
HCO3 BLD-SCNC: 23.2 MEQ/L (ref 21–32)
HCT VFR BLD CALC: 50 % (ref 39–51)
HGB BLD-MCNC: 16.9 GM/DL (ref 13–17)
LYMPHOCYTES # BLD AUTO: 2.1 TH/MM3 (ref 1–4.8)
LYMPHOCYTES NFR BLD AUTO: 12.7 % (ref 9–44)
MAGNESIUM SERPL-MCNC: 1.8 MG/DL (ref 1.5–2.5)
MCH RBC QN AUTO: 31 PG (ref 27–34)
MCHC RBC AUTO-ENTMCNC: 33.8 % (ref 32–36)
MCV RBC AUTO: 91.7 FL (ref 80–100)
MONOCYTE #: 2.1 TH/MM3 (ref 0–0.9)
MONOCYTES NFR BLD: 12.7 % (ref 0–8)
NEUTROPHILS # BLD AUTO: 11.2 TH/MM3 (ref 1.8–7.7)
NEUTROPHILS NFR BLD AUTO: 69.9 % (ref 16–70)
PHOSPHATE SERPL-MCNC: 2.7 MG/DL (ref 2.5–4.9)
PLATELET # BLD: 210 TH/MM3 (ref 150–450)
PMV BLD AUTO: 8.4 FL (ref 7–11)
RBC # BLD AUTO: 5.45 MIL/MM3 (ref 4.5–5.9)
SODIUM SERPL-SCNC: 135 MEQ/L (ref 136–145)
WBC # BLD AUTO: 16.2 TH/MM3 (ref 4–11)

## 2018-03-02 RX ADMIN — ONDANSETRON PRN MG: 2 INJECTION, SOLUTION INTRAMUSCULAR; INTRAVENOUS at 18:34

## 2018-03-02 RX ADMIN — INSULIN ASPART SCH: 100 INJECTION, SOLUTION INTRAVENOUS; SUBCUTANEOUS at 12:29

## 2018-03-02 RX ADMIN — CHLORHEXIDINE GLUCONATE SCH PACK: 500 CLOTH TOPICAL at 04:00

## 2018-03-02 RX ADMIN — INSULIN ASPART SCH: 100 INJECTION, SOLUTION INTRAVENOUS; SUBCUTANEOUS at 08:47

## 2018-03-02 RX ADMIN — INSULIN ASPART SCH: 100 INJECTION, SOLUTION INTRAVENOUS; SUBCUTANEOUS at 18:01

## 2018-03-02 RX ADMIN — ACYCLOVIR SCH UNITS: 800 TABLET ORAL at 08:47

## 2018-03-02 RX ADMIN — CHLORHEXIDINE GLUCONATE SCH PACK: 500 CLOTH TOPICAL at 20:30

## 2018-03-02 RX ADMIN — ONDANSETRON PRN MG: 2 INJECTION, SOLUTION INTRAMUSCULAR; INTRAVENOUS at 05:08

## 2018-03-02 RX ADMIN — INSULIN ASPART SCH: 100 INJECTION, SOLUTION INTRAVENOUS; SUBCUTANEOUS at 21:00

## 2018-03-02 RX ADMIN — ONDANSETRON PRN MG: 2 INJECTION, SOLUTION INTRAMUSCULAR; INTRAVENOUS at 12:30

## 2018-03-02 NOTE — HHI.PR
Subjective


Remarks


Patient seen and examined today for follow-up on diabetic ketoacidosis.  

Patient states that he does not have much of an appetite.  When he tries to eat 

he gets nauseous and has to fight not throwing up.  The present time he is no 

longer on insulin IV.  Patient does indicate that he has been ignoring his 

diabetes and he has not been taking any medications.  I did  him 

extensively on the life-threatening complications out will arise if he 

continues to live the same lifestyle he is doing.  Patient does understand.  

Patient also indicates that he was told that he has a fever.  Patient 

clinically improved.  We'll transfer to medical floor.





Objective


Vitals





Vital Signs








  Date Time  Temp Pulse Resp B/P (MAP) Pulse Ox O2 Delivery O2 Flow Rate FiO2


 


3/2/18 13:02  72      


 


3/2/18 13:02  72 41 130/65 (86)    


 


3/2/18 12:02  72      


 


3/2/18 12:02 100.0 72 32 124/78 (93)    


 


3/2/18 11:02  76  104/53 (70)    


 


3/2/18 10:37  85      


 


3/2/18 10:02  78  120/73 (89)    


 


3/2/18 09:02  100  118/73 (88)    


 


3/2/18 08:02  76  113/72 (86)    


 


3/2/18 08:00  88      


 


3/2/18 07:02 98.1 104 33 135/74 (94)    


 


3/2/18 06:02  74 23 155/85 (108) 96   


 


3/2/18 06:00  106      


 


3/2/18 05:02  78 19 148/85 (106) 96   


 


3/2/18 04:02 98.0 106 21 136/67 (90)    


 


3/2/18 04:00  72      


 


3/2/18 03:02  72 36 122/60 (80)    


 


3/2/18 02:02  84 20 131/79 (96) 96   


 


3/2/18 02:00  117      


 


3/2/18 01:06 98.8 120 28 137/88 (104) 96   


 


3/2/18 00:00  74      


 


3/1/18 23:02  96 22 136/68 (90) 96   


 


3/1/18 22:02  110 21 128/72 (90)    


 


3/1/18 22:00  96      


 


3/1/18 21:07   22     


 


3/1/18 21:02  118 34 154/88 (110) 98   


 


3/1/18 20:02 97.6 116 26 150/72 (98) 98   


 


3/1/18 20:00  96      


 


3/1/18 19:02  80 20 131/76 (94) 98   


 


3/1/18 18:01  82      


 


3/1/18 17:31  86      


 


3/1/18 17:02  80      


 


3/1/18 17:01  82      


 


3/1/18 16:31  108      


 


3/1/18 16:02  102      


 


3/1/18 16:02 97.8 102 22 133/66 (88)    


 


3/1/18 16:01  108      


 


3/1/18 16:01  108      














I/O      


 


 3/1/18 3/1/18 3/1/18 3/2/18 3/2/18 3/2/18





 07:00 15:00 23:00 07:00 15:00 23:00


 


Intake Total 529 ml 200 ml 600 ml 1050 ml  


 


Output Total 700 ml   2500 ml  


 


Balance -171 ml 200 ml 600 ml -1450 ml  


 


      


 


Intake Oral 0 ml   1050 ml  


 


IV Total 529 ml 200 ml 600 ml   


 


Output Urine Total 700 ml   2500 ml  


 


# Voids 4     


 


# Bowel Movements 0   0  








Result Diagram:  


3/2/18 0445                                                                    

            3/2/18 0445





Objective Remarks


GENERAL: Well-developed, morbidly obese with BMI 50.5, in no acute distress. 

alert and orientated


HEENT: Head is normocephalic without any lesions or masses noted. Facial 

features are symmetric. Eyes: Extraocular muscles are intact. Conjunctivae were 

clear.


NECK: Supple without any masses. Trachea midline no deviation. No JVD,


CARDIAC: Regular rhythm, regular rate. S1/S2 are heard. No murmurs gallops or 

rubs.


LUNGS: Clear to auscultation bilaterally. No wheeze, rhonchi or rales. No use 

of accessory muscles on inspiration or expiration.


ABDOMEN: Soft, nontender. Nondistended. Bowel sounds heard in all 4 quadrants. 

No organomegaly or masses. Negative rebound, negative guarding


EXTREMITIES: No edema, pulses are equal bilaterally. No cyanosis or clubbing


NEUROLOGY: Mood and affect appear appropriate. Cranial nerves II through XII 

grossly intact.  Moving all extremities, speech is clear


Urinary Catheter:  No


Vascular Central Line Catheter:  No





A/P


Assessment and Plan


Diabetic ketoacidosis, secondary to medication noncompliance


   IV insulin protocol has been discontinued.  Anion gap has closed


   Accu-Cheks with sliding scale insulin patient received 18 units of regular 

insulin in the last 24 hours


   Levemir 10 units at bedtime, will increase to Levemir 10 units twice daily


   Diabetic education has been performed


   Obtain hemoglobin A1c





Nausea, vomiting, possible gastroparesis


   Advance diet as tolerated


   Continue Zofran


   Start Reglan





Low-grade fever, 100.0 axillary


   Patient does have some mild leukocytosis, however given secondary to severe 

DKA


   Obtain blood cultures


   Urinalysis is clear


   Chest x-rays without any acute abnormality


   Abdominal CT was without any acute abnormality


   Obtain influenza testing





Hypokalemia


   Continue monitor and replete as needed





Tobacco abuse


   Patient counseled on smoking cessation





DVT prevention


   sequential compression devices


Discharge Planning


Discharge planning in 24-48 hours.  Consulted case management for patient care 

assistance, prescription assistance, outpatient follow-up











Chavez Story Mar 2, 2018 16:00

## 2018-03-03 VITALS
TEMPERATURE: 97.9 F | OXYGEN SATURATION: 94 % | SYSTOLIC BLOOD PRESSURE: 143 MMHG | DIASTOLIC BLOOD PRESSURE: 81 MMHG | HEART RATE: 69 BPM | RESPIRATION RATE: 20 BRPM

## 2018-03-03 VITALS
SYSTOLIC BLOOD PRESSURE: 131 MMHG | RESPIRATION RATE: 20 BRPM | TEMPERATURE: 98.1 F | OXYGEN SATURATION: 97 % | HEART RATE: 91 BPM | DIASTOLIC BLOOD PRESSURE: 71 MMHG

## 2018-03-03 VITALS
OXYGEN SATURATION: 96 % | RESPIRATION RATE: 20 BRPM | HEART RATE: 68 BPM | TEMPERATURE: 97.6 F | DIASTOLIC BLOOD PRESSURE: 76 MMHG | SYSTOLIC BLOOD PRESSURE: 144 MMHG

## 2018-03-03 LAB
BASOPHILS # BLD AUTO: 0.1 TH/MM3 (ref 0–0.2)
BASOPHILS NFR BLD: 0.7 % (ref 0–2)
BUN SERPL-MCNC: 12 MG/DL (ref 7–18)
CALCIUM SERPL-MCNC: 8.3 MG/DL (ref 8.5–10.1)
CHLORIDE SERPL-SCNC: 96 MEQ/L (ref 98–107)
CREAT SERPL-MCNC: 0.52 MG/DL (ref 0.6–1.3)
EOSINOPHIL # BLD: 0.4 TH/MM3 (ref 0–0.4)
EOSINOPHIL NFR BLD: 3.5 % (ref 0–4)
ERYTHROCYTE [DISTWIDTH] IN BLOOD BY AUTOMATED COUNT: 12.7 % (ref 11.6–17.2)
GFR SERPLBLD BASED ON 1.73 SQ M-ARVRAT: 170 ML/MIN (ref 89–?)
GLUCOSE SERPL-MCNC: 249 MG/DL (ref 74–106)
HCO3 BLD-SCNC: 25.5 MEQ/L (ref 21–32)
HCT VFR BLD CALC: 46 % (ref 39–51)
HGB BLD-MCNC: 16 GM/DL (ref 13–17)
LYMPHOCYTES # BLD AUTO: 2 TH/MM3 (ref 1–4.8)
LYMPHOCYTES NFR BLD AUTO: 19.4 % (ref 9–44)
MAGNESIUM SERPL-MCNC: 1.9 MG/DL (ref 1.5–2.5)
MCH RBC QN AUTO: 31.5 PG (ref 27–34)
MCHC RBC AUTO-ENTMCNC: 34.7 % (ref 32–36)
MCV RBC AUTO: 90.8 FL (ref 80–100)
MONOCYTE #: 1.3 TH/MM3 (ref 0–0.9)
MONOCYTES NFR BLD: 12.8 % (ref 0–8)
NEUTROPHILS # BLD AUTO: 6.3 TH/MM3 (ref 1.8–7.7)
NEUTROPHILS NFR BLD AUTO: 63.6 % (ref 16–70)
PLATELET # BLD: 193 TH/MM3 (ref 150–450)
PMV BLD AUTO: 8.9 FL (ref 7–11)
RBC # BLD AUTO: 5.07 MIL/MM3 (ref 4.5–5.9)
SODIUM SERPL-SCNC: 133 MEQ/L (ref 136–145)
WBC # BLD AUTO: 10.1 TH/MM3 (ref 4–11)

## 2018-03-03 RX ADMIN — ONDANSETRON PRN MG: 2 INJECTION, SOLUTION INTRAMUSCULAR; INTRAVENOUS at 08:09

## 2018-03-03 RX ADMIN — ONDANSETRON PRN MG: 2 INJECTION, SOLUTION INTRAMUSCULAR; INTRAVENOUS at 00:23

## 2018-03-03 RX ADMIN — INSULIN ASPART SCH: 100 INJECTION, SOLUTION INTRAVENOUS; SUBCUTANEOUS at 12:10

## 2018-03-03 RX ADMIN — ONDANSETRON PRN MG: 2 INJECTION, SOLUTION INTRAMUSCULAR; INTRAVENOUS at 15:43

## 2018-03-03 RX ADMIN — INSULIN ASPART SCH: 100 INJECTION, SOLUTION INTRAVENOUS; SUBCUTANEOUS at 08:04

## 2018-03-03 NOTE — HHI.DCPOC
Discharge Care Plan


Diagnosis:  


(1) DKA (diabetic ketoacidoses)


Goals to Promote Your Health


* To prevent worsening of your condition and complications


* To maintain your health at the optimal level


Directions to Meet Your Goals


*** Take your medications as prescribed


*** Follow your dietary instruction


*** Follow activity as directed








*** Keep your appointments as scheduled


*** Take your immunizations and boosters as scheduled


*** If your symptoms worsen call your PCP, if no PCP go to Urgent Care Center 

or Emergency Room***


*** Smoking is Dangerous to Your Health. Avoid second hand smoke***


***Call the 24-hour hour crisis hotline for domestic abuse at 1-910.351.7359***











Chavez Story Mar 3, 2018 11:15

## 2018-03-03 NOTE — HHI.PR
Subjective


Remarks


Patient seen and examined today for follow-up on noncompliant diabetes.  

Patient states still has a spot in his abdomen whenever he eats that bothers 

him.  However he is eating at this time.  His blood sugars are going up since 

he is eating better.  We will need to adjust insulin.  Patient will require 

refills on all of his prescriptions as well as prescriptions for needles, 

glucometer strips.





Objective


Vitals





Vital Signs








  Date Time  Temp Pulse Resp B/P (MAP) Pulse Ox O2 Delivery O2 Flow Rate FiO2


 


3/3/18 03:58        


 


3/3/18 00:04 98.1 91 20 131/71 (91) 97   


 


3/2/18 20:14 97.9 96 18 129/69 (89) 95   


 


3/2/18 17:02  78 44 153/86 (108)    


 


3/2/18 17:00  78      


 


3/2/18 16:02 98.9 74 40 147/81 (103)    


 


3/2/18 16:00  74      


 


3/2/18 15:02  98 28 141/73 (95)    


 


3/2/18 15:00  102      


 


3/2/18 14:02  70 30 138/83 (101)    


 


3/2/18 14:00  68      


 


3/2/18 13:02  72      


 


3/2/18 13:02  72 41 130/65 (86)    


 


3/2/18 12:02  72      


 


3/2/18 12:02 100.0 72 32 124/78 (93)    


 


3/2/18 11:02  76  104/53 (70)    


 


3/2/18 10:37  85      


 


3/2/18 10:02  78  120/73 (89)    


 


3/2/18 09:02  100  118/73 (88)    


 


3/2/18 08:02  76  113/72 (86)    


 


3/2/18 08:00  88      














I/O      


 


 3/2/18 3/2/18 3/2/18 3/3/18 3/3/18 3/3/18





 07:00 15:00 23:00 07:00 15:00 23:00


 


Intake Total 1050 ml     


 


Output Total 2500 ml  1075 ml   


 


Balance -1450 ml  -1075 ml   


 


      


 


Intake Oral 1050 ml     


 


Output Urine Total 2500 ml  1075 ml   


 


# Voids   4 4  


 


# Bowel Movements 0  0 0  








Result Diagram:  


3/3/18 0555                                                                    

            3/2/18 0445





Objective Remarks


GENERAL: Well-developed, morbidly obese with BMI 50.5, in no acute distress. 

alert and orientated


HEENT: Head is normocephalic without any lesions or masses noted. Facial 

features are symmetric. Eyes: Extraocular muscles are intact. Conjunctivae were 

clear.


NECK: Supple without any masses. Trachea midline no deviation. No JVD,


CARDIAC: Regular rhythm, regular rate. S1/S2 are heard. No murmurs gallops or 

rubs.


LUNGS: Clear to auscultation bilaterally. No wheeze, rhonchi or rales. No use 

of accessory muscles on inspiration or expiration.


ABDOMEN: Soft, nontender. Nondistended. Bowel sounds heard in all 4 quadrants. 

No organomegaly or masses. Negative rebound, negative guarding


EXTREMITIES: No edema, pulses are equal bilaterally. No cyanosis or clubbing


NEUROLOGY: Mood and affect appear appropriate. Cranial nerves II through XII 

grossly intact.  Moving all extremities, speech is clear


Urinary Catheter:  No


Vascular Central Line Catheter:  No





A/P


Assessment and Plan


Diabetic ketoacidosis, secondary to medication noncompliance


   IV insulin protocol has been discontinued.  Anion gap has closed


   Accu-Cheks with sliding scale insulin patient received 18 units of regular 

insulin in the last 24 hours


   Levemir 15 units twice daily


   Diabetic education has been performed


   Hemoglobin A1c 13.5





Nausea, vomiting, possible gastroparesis


   Advance diet as tolerated


   Continue Zofran


   Continue Reglan





Low-grade fever, 100.0 axillary, resolved


   Patient did have some mild leukocytosis which has resolved


   Blood cultures are pending


   Urinalysis is clear


   Chest x-rays without any acute abnormality


   Abdominal CT was without any acute abnormality


   Influenza testing was negative





Hypokalemia


   Continue monitor and replete as needed





Tobacco abuse


   Patient counseled on smoking cessation





DVT prevention


   sequential compression devices


Discharge Planning


Discharge planning may be later this afternoon or tomorrow given the patient's 

response to treatment











Chavez Story Mar 3, 2018 07:48

## 2018-03-03 NOTE — HHI.DS
__________________________________________________





Discharge Summary


Admission Date


Feb 28, 2018 at 22:11


Discharge Date:  Mar 3, 2018


Admitting Diagnosis





DKA





(1) DKA (diabetic ketoacidoses)


ICD Code:  E13.10 - Other specified diabetes mellitus with ketoacidosis without 

coma


Status:  Acute


Procedures


None


Brief History - From Admission


The patient is a morbidly obese 47-year-old male with history of diabetes who 

presented to the emergency department with complaint of abdominal pain that 

started at 4 AM yesterday.  He states that the pain woke him from sleep.  He 

has had nausea and vomiting and has not been able to keep any food or drink 

down since that time.  He also reports subjective fever and chills.  Denies 

diarrhea or constipation.  Denies chest pain or dyspnea.  States that he has 

been taking pain medication for a toothache.  He states that he ate 3 slices of 

pizza before going to bed 2 nights ago and had no nausea or vomiting until he 

was awakened at 4 AM with abdominal pain.  He reports that the pain has 

improved and he is thirsty this morning.


CBC/BMP:  


3/3/18 0555                                                                    

            3/3/18 0555





Significant Findings





Laboratory Tests








Test


  2/28/18


19:30 2/28/18


20:10 2/28/18


20:15 2/28/18


23:10


 


White Blood Count


  12.1 TH/MM3


(4.0-11.0) 


  


  


 


 


Hemoglobin


  18.1 GM/DL


(13.0-17.0) 


  


  


 


 


Hematocrit


  51.4 %


(39.0-51.0) 


  


  


 


 


Neutrophils (%) (Auto)


  84.7 %


(16.0-70.0) 


  


  


 


 


Lymphocytes (%) (Auto)


  7.2 %


(9.0-44.0) 


  


  


 


 


Basophils (%) (Auto)


  2.4 %


(0.0-2.0) 


  


  


 


 


Neutrophils # (Auto)


  10.3 TH/MM3


(1.8-7.7) 


  


  


 


 


Lymphocytes # (Auto)


  0.9 TH/MM3


(1.0-4.8) 


  


  


 


 


Basophils # (Auto)


  0.3 TH/MM3


(0-0.2) 


  


  


 


 


Activated Partial


Thromboplast Time 22.2 SEC


(24.3-30.1) 


  


  


 


 


Random Glucose


  375 MG/DL


() 


  


  324 MG/DL


()


 


Carbon Dioxide Level


  20.7 MEQ/L


(21.0-32.0) 


  


  16.5 MEQ/L


(21.0-32.0)


 


Anion Gap


  18 MEQ/L


(5-15) 


  


  18 MEQ/L


(5-15)


 


Troponin I


  LESS THAN 0.02


NG/ML 


  


  


 


 


B-Hydroxybutyrate


  6.65 MMOL/L


(0.00-0.39) 


  


  6.90 MMOL/L


(0.00-0.39)


 


Urine Specific Gravity


  


  GREATER THAN


1.035 


  


 


 


Urine Glucose (UA)


  


  1000 OR


GREATER mg/dL 


  


 


 


Urine Ketones


  


  80 OR GREATER


mg/dL (NEG) 


  


 


 


Venous Blood pH


  


  


  7.31


(7.360-7.400) 


 


 


Venous Blood Partial Pressure


CO2 


  


  42 mmHg


(44-48) 


 


 


Venous Blood Partial Pressure


O2 


  


  32 mmHg


(35-40) 


 


 


Venous Blood HCO3


  


  


  21 mmol/L


(22-26) 


 


 


Venous Blood Oxygen Saturation   55 % (70-76)  


 


Venous Blood Base Excess


  


  


  -4.4 mmol/L


(-2-2) 


 


 


Calcium Level


  


  


  


  8.4 MG/DL


(8.5-10.1)


 


Test


  3/1/18


01:05 3/1/18


02:50 3/1/18


04:30 3/1/18


09:10


 


Random Glucose


  287 MG/DL


() 174 MG/DL


() 


  214 MG/DL


()


 


Calcium Level


  8.4 MG/DL


(8.5-10.1) 


  


  8.3 MG/DL


(8.5-10.1)


 


Carbon Dioxide Level


  15.3 MEQ/L


(21.0-32.0) 19.0 MEQ/L


(21.0-32.0) 


  


 


 


Anion Gap


  19 MEQ/L


(5-15) 17 MEQ/L


(5-15) 


  


 


 


Potassium Level


  


  3.3 MEQ/L


(3.5-5.1) 


  


 


 


Estimat Glomerular Filtration


Rate 


  88 ML/MIN


(>89) 


  


 


 


Phosphorus Level


  


  


  


  1.5 MG/DL


(2.5-4.9)


 


Chloride Level


  


  


  


  108 MEQ/L


()


 


B-Hydroxybutyrate


  


  


  


  1.52 MMOL/L


(0.00-0.39)


 


Test


  3/1/18


14:30 3/1/18


21:20 3/2/18


04:45 3/2/18


14:32


 


Random Glucose


  213 MG/DL


() 280 MG/DL


() 252 MG/DL


() 


 


 


Calcium Level


  8.0 MG/DL


(8.5-10.1) 8.0 MG/DL


(8.5-10.1) 8.2 MG/DL


(8.5-10.1) 


 


 


Phosphorus Level


  2.3 MG/DL


(2.5-4.9) 2.4 MG/DL


(2.5-4.9) 


  


 


 


Potassium Level


  


  3.4 MEQ/L


(3.5-5.1) 


  


 


 


B-Hydroxybutyrate


  


  2.65 MMOL/L


(0.00-0.39) 


  


 


 


White Blood Count


  


  


  16.2 TH/MM3


(4.0-11.0) 


 


 


Monocytes (%) (Auto)


  


  


  12.7 %


(0.0-8.0) 


 


 


Basophils (%) (Auto)


  


  


  3.0 %


(0.0-2.0) 


 


 


Neutrophils # (Auto)


  


  


  11.2 TH/MM3


(1.8-7.7) 


 


 


Monocytes # (Auto)


  


  


  2.1 TH/MM3


(0-0.9) 


 


 


Basophils # (Auto)


  


  


  0.5 TH/MM3


(0-0.2) 


 


 


Sodium Level


  


  


  135 MEQ/L


(136-145) 


 


 


Hemoglobin A1c


  


  


  


  13.5 %


(4.3-6.0)


 


Test


  3/3/18


05:55 


  


  


 


 


Monocytes (%) (Auto)


  12.8 %


(0.0-8.0) 


  


  


 


 


Monocytes # (Auto)


  1.3 TH/MM3


(0-0.9) 


  


  


 


 


Creatinine


  0.52 MG/DL


(0.60-1.30) 


  


  


 


 


Random Glucose


  249 MG/DL


() 


  


  


 


 


Calcium Level


  8.3 MG/DL


(8.5-10.1) 


  


  


 


 


Sodium Level


  133 MEQ/L


(136-145) 


  


  


 


 


Potassium Level


  3.3 MEQ/L


(3.5-5.1) 


  


  


 


 


Chloride Level


  96 MEQ/L


() 


  


  


 








Imaging





Last Impressions








Chest X-Ray 2/28/18 1908 Signed





Impressions: 





 Service Date/Time:  Wednesday, February 28, 2018 19:30 - CONCLUSION: Mild 





 increased interstitial markings bilaterally which are nonspecific.     Guru Barros MD 


 


Abdomen/Pelvis CT 2/28/18 1908 Signed





Impressions: 





 Service Date/Time:  Wednesday, February 28, 2018 20:26 - CONCLUSION: Enlarged 





 fatty liver. Otherwise unremarkable CT of the abdomen and pelvis.     Guru Barros MD 








PE at Discharge


GENERAL: Well-developed, morbidly obese with BMI 50.5, in no acute distress. 

alert and orientated


HEENT: Head is normocephalic without any lesions or masses noted. Facial 

features are symmetric. Eyes: Extraocular muscles are intact. Conjunctivae were 

clear.


NECK: Supple without any masses. Trachea midline no deviation. No JVD,


CARDIAC: Regular rhythm, regular rate. S1/S2 are heard. No murmurs gallops or 

rubs.


LUNGS: Clear to auscultation bilaterally. No wheeze, rhonchi or rales. No use 

of accessory muscles on inspiration or expiration.


ABDOMEN: Soft, nontender. Nondistended. Bowel sounds heard in all 4 quadrants. 

No organomegaly or masses. Negative rebound, negative guarding


EXTREMITIES: No edema, pulses are equal bilaterally. No cyanosis or clubbing


NEUROLOGY: Mood and affect appear appropriate. Cranial nerves II through XII 

grossly intact.  Moving all extremities, speech is clear


Hospital Course


Is a 47 year-old  male with known history of diabetes.  The patient 

initially came to emergency department because of abdominal pain with nausea, 

vomiting.  Patient was found to have diabetic ketoacidosis with diabetic 

gastroparesis.  Patient openly admits that he is noncompliant with his insulin 

regimen.  Hemoglobin A1c was 13.5.  Workup indicated patient has significant 

diabetic you acidosis with anion gap.  Patient was started on insulin drip and 

was managed in the ICU.  Anion gap took longer to close in usual.  However he 

was successfully weaned off the insulin drip.  Patient was started on Levemir 

and sliding scale insulin.  Patient does not have any health insurance and doesn

't have the money for long acting insulin.  He indicates that he can only 

afford the medication at [facility].  Case management was consulted for patient 

care assistance, however patient does not reside [facility] or in 

[place].  Unable to supply patient with [facility] patient care assistance.  

Long-acting insulin was adjusted.  Diabetes under better control this time.  

Will convert to Humulin and and sliding scale insulin.  Did take 75% of total 

insulin dosage to arrive at appropriate conversion to NPH insulin.  Patient was 

seen by diabetic educator and underwent diabetic education.  Patient will also 

have a mandatory referral for endocrinologist Dr. Lake.  Patient 

clinically stable this time.  He is eating without any recurrent nausea 

vomiting.  Diabetes under better control.  Will plan discharge accordingly.


Pt Condition on Discharge:  Stable


Discharge Disposition:  Discharge Home


Discharge Time:  > 30 minutes


Discharge Instructions


DIET: Follow Instructions for:  Diabetic Diet


Activities you can perform:  Regular-No Restrictions


Follow up Referrals:  


Endocrinology - 1 Week


PCP Follow-up - 1 Week





New Medications:  


Glucocom Test Strips (Glucocom Test Strips) 1 Mignon Mignon


EA .XX AS DIRECTED for Blood Sugar Management, #1





Insulin Aspart Inj (Novolog Inj) 1,000 Unit/10 Ml Vial


2-12 UNITS SQ ACHS for Blood Sugar Management, #10 ML 0 Refills


Max dose at bedtime ( ) units; sugars less than 70,(0) units; sugars


 150-199,(2) units; sugars 200-249,(4) units; sugars 250-299,(7) units;


 sugars 300-349,(10) units; sugars greater than 349,(12)units


Insulin Human NPH Inj (Humulin N Inj) 1,000 Unit/10 Ml Vial


20 UNITS SQ BID for Blood Sugar Management, #10 ML 0 Refills





Insulin Syringe/U-100/31G X 5/16" 1 ml (Insulin Syringe/U-100/31G X 5/16" 1 ml) 

31 Gauge X 5/16" Mis


EA .XX AS DIRECTED for Blood Sugar Management, #1 0 Refills





Lancets (Lancets) 1 Mis Mis


EA .XX AS DIRECTED for Blood Sugar Management, #1 0 Refills





Ondansetron Odt (Zofran Odt) 4 Mg Tab


4 MG SL Q6HR PRN for Nausea/Vomiting, #30 TAB 0 Refills





 


Continued Medications:  


Acetaminophen (Tylenol) 325 Mg Tab


Unknown Dose PO ONCE, #1 TAB 0 Refills





Hydrocodone-Acetaminophen (Norco) 5 Mg-325 Mg Tab


Unknown Dose PO Q4H PRN for PAIN, TAB 0 Refills





Ibuprofen (Ibuprofen) 200 Mg Cap


Unknown Dose Q4H PRN for PAIN 1 TO 10 AND/OR AGITATION, CAP 0 Refills





 


Discontinued Medications:  


[Nsaid] ()  


Unknown Dose

















Chavez Story Mar 3, 2018 12:58